# Patient Record
Sex: FEMALE | Race: ASIAN | NOT HISPANIC OR LATINO | Employment: OTHER | URBAN - METROPOLITAN AREA
[De-identification: names, ages, dates, MRNs, and addresses within clinical notes are randomized per-mention and may not be internally consistent; named-entity substitution may affect disease eponyms.]

---

## 2017-09-27 ENCOUNTER — TELEPHONE (OUTPATIENT)
Dept: NEUROLOGY | Facility: CLINIC | Age: 32
End: 2017-09-27

## 2017-09-27 ENCOUNTER — OFFICE VISIT (OUTPATIENT)
Dept: NEUROLOGY | Facility: CLINIC | Age: 32
End: 2017-09-27
Attending: PSYCHIATRY & NEUROLOGY
Payer: COMMERCIAL

## 2017-09-27 VITALS — BODY MASS INDEX: 20.09 KG/M2 | WEIGHT: 125 LBS | HEIGHT: 66 IN

## 2017-09-27 DIAGNOSIS — S06.9X6A: Primary | ICD-10-CM

## 2017-09-27 DIAGNOSIS — R40.3: Primary | ICD-10-CM

## 2017-09-27 DIAGNOSIS — R40.3: ICD-10-CM

## 2017-09-27 DIAGNOSIS — G82.50 SPASTIC QUADRIPARESIS: ICD-10-CM

## 2017-09-27 DIAGNOSIS — G24.9 DYSTONIA: ICD-10-CM

## 2017-09-27 DIAGNOSIS — R56.1 POST TRAUMATIC SEIZURES: ICD-10-CM

## 2017-09-27 PROCEDURE — 99999 PR PBB SHADOW E&M-NEW PATIENT-LVL III: CPT | Mod: PBBFAC,,, | Performed by: PSYCHIATRY & NEUROLOGY

## 2017-09-27 PROCEDURE — 3008F BODY MASS INDEX DOCD: CPT | Mod: S$GLB,,, | Performed by: PSYCHIATRY & NEUROLOGY

## 2017-09-27 PROCEDURE — 99205 OFFICE O/P NEW HI 60 MIN: CPT | Mod: S$GLB,,, | Performed by: PSYCHIATRY & NEUROLOGY

## 2017-09-27 RX ORDER — LEVETIRACETAM 100 MG/ML
500 SOLUTION ORAL 2 TIMES DAILY
COMMUNITY

## 2017-09-27 RX ORDER — AMANTADINE HYDROCHLORIDE 50 MG/5ML
100 SOLUTION ORAL 2 TIMES DAILY
COMMUNITY

## 2017-09-27 RX ORDER — CARBIDOPA AND LEVODOPA 10; 100 MG/1; MG/1
1 TABLET ORAL 3 TIMES DAILY
Qty: 90 TABLET | Refills: 11 | Status: SHIPPED | OUTPATIENT
Start: 2017-09-27 | End: 2018-09-27

## 2017-09-27 RX ORDER — DIAZEPAM 2.5 MG/.5ML
2.5 GEL RECTAL
Qty: 1 KIT | Refills: 1 | Status: SHIPPED | OUTPATIENT
Start: 2017-09-27

## 2017-09-27 RX ORDER — ZOLPIDEM TARTRATE 5 MG/1
5 TABLET ORAL NIGHTLY PRN
Qty: 10 TABLET | Refills: 0 | Status: SHIPPED | OUTPATIENT
Start: 2017-09-27 | End: 2018-03-28

## 2017-09-27 NOTE — TELEPHONE ENCOUNTER
Spoke to sister and informed her of [facility] Assisted Tech eval.  SLP re-ordered.  Contact info provided.  Pt acknowledged understanding of results and agreement with plan.
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
## 2017-09-27 NOTE — TELEPHONE ENCOUNTER
----- Message from KRISTEN Turpin, CCC-SLP sent at 9/27/2017  2:30 PM CDT -----  Thanks for the referral but I believe she will best be served at [facility] to be seen by Dr. Ok Seaman. You will need to write an order for an AAC evaluation. So, I will NOT see her unless they do not want to go to [facility]. Is that ok?   Do you want me to call the family or will your staff call them about [facility]?    Ok Seaman, Ph.D., CCC-SLP  , Speech-language Pathology  Communication Disorders,   Phone: [phone]  Email: [email or web address]    Thanks,  Amelia    ----- Message -----  From: Janett Oscar MD  Sent: 9/27/2017  11:00 AM  To: KRISTEN Turpin, CCC-SLP    Transportation is an issue for her, so whatever gets the most bang for our gonzalez.  That may be Dr. Rinaldi, but I'll defer to you.  She's pretty low functioning, so I am not sure how much we can do for her, but she can track and answer simple commands with eye blinks pretty consistently.   Her family is very involved and one of her sisters is a physician, so they are savvy and understand the limitations of what we can achieve.      ----- Message -----  From: KRISTEN Turpin, CCC-SLP  Sent: 9/27/2017  10:33 AM  To: Janett Oscar MD    HI Dr. Oscar,     Do you want me to do the Augmentative/Alternative Communication (AAC) evaluation and set her up with a speech generating device OR do you want me to send her directly to [facility] for the evaluation with Dr. Ok Seaman, SLP? I can get [employer] and [employer] reps to come to my office with devices to do the evaluation. Dr. Seaman has more devices and equipment to do the evaluation during one visit. What other assistive technology does she need?      ----- Message -----  From: Janett Oscar MD  Sent: 9/27/2017  10:19 AM  To: KRISTEN Turpin, CCC-SLP    Severe TBI in MCS, interested in assisted technology.  Can you plug them into the [facility] program after your evaluation?

## 2017-09-27 NOTE — LETTER
September 27, 2017        Refugio Swann MD  [address]  [address]  [place] [address]             [facility]  [address]  [place] [address]  Phone: [phone]  Fax: [phone]   Patient: Charu Lawrence   MR Number: [id number]   YOB: 1985   Date of Visit: 9/27/2017       Dear Dr. Swann:    Thank you for referring Charu Lawrence to me for evaluation. Attached you will find relevant portions of my assessment and plan of care.    If you have questions, please do not hesitate to call me. I look forward to following Charu Lawrence along with you.    Sincerely,      Janett Oscar MD            CC  No Recipients    Enclosure

## 2017-09-27 NOTE — TELEPHONE ENCOUNTER
Called and left voicemail for patient to see if she was still able to attend her appointment this morning with . Asked patient to give me a call back at the office if need to reschedule also.

## 2017-09-27 NOTE — Clinical Note
Severe TBI in MCS, interested in assisted technology. Can you plug them into the [facility] program after your evaluation?